# Patient Record
Sex: FEMALE | Race: BLACK OR AFRICAN AMERICAN | URBAN - METROPOLITAN AREA
[De-identification: names, ages, dates, MRNs, and addresses within clinical notes are randomized per-mention and may not be internally consistent; named-entity substitution may affect disease eponyms.]

---

## 2017-01-05 ENCOUNTER — OFFICE VISIT (OUTPATIENT)
Dept: FAMILY MEDICINE CLINIC | Age: 27
End: 2017-01-05

## 2017-01-05 VITALS
TEMPERATURE: 99.1 F | DIASTOLIC BLOOD PRESSURE: 86 MMHG | OXYGEN SATURATION: 98 % | BODY MASS INDEX: 33.49 KG/M2 | HEIGHT: 65 IN | WEIGHT: 201 LBS | RESPIRATION RATE: 16 BRPM | SYSTOLIC BLOOD PRESSURE: 111 MMHG | HEART RATE: 74 BPM

## 2017-01-05 DIAGNOSIS — J45.20 MILD INTERMITTENT ASTHMA WITHOUT COMPLICATION: ICD-10-CM

## 2017-01-05 DIAGNOSIS — K64.8 HEMORRHOID PROLAPSE: Primary | ICD-10-CM

## 2017-01-05 DIAGNOSIS — M25.562 ACUTE PAIN OF LEFT KNEE: ICD-10-CM

## 2017-01-05 RX ORDER — HYDROCORTISONE ACETATE PRAMOXINE HCL 1; 1 G/100G; G/100G
CREAM TOPICAL 2 TIMES DAILY
Qty: 30 G | Refills: 0 | Status: SHIPPED | OUTPATIENT
Start: 2017-01-05 | End: 2017-01-12

## 2017-01-05 RX ORDER — MELOXICAM 15 MG/1
15 TABLET ORAL
Qty: 30 TAB | Refills: 0 | Status: SHIPPED | OUTPATIENT
Start: 2017-01-05

## 2017-01-05 RX ORDER — FLUTICASONE PROPIONATE 110 UG/1
2 AEROSOL, METERED RESPIRATORY (INHALATION) EVERY 12 HOURS
Qty: 1 INHALER | Refills: 5 | Status: SHIPPED | OUTPATIENT
Start: 2017-01-05 | End: 2017-01-12

## 2017-01-05 RX ORDER — ALBUTEROL SULFATE 90 UG/1
2 AEROSOL, METERED RESPIRATORY (INHALATION)
Qty: 1 INHALER | Refills: 1 | Status: SHIPPED | OUTPATIENT
Start: 2017-01-05

## 2017-01-05 NOTE — MR AVS SNAPSHOT
Visit Information Date & Time Provider Department Dept. Phone Encounter #  
 1/5/2017  3:30 PM Mariel Parker PA-C [facility] [phone] [id number] Follow-up Instructions Return if symptoms worsen or fail to improve. Upcoming Health Maintenance Date Due  
 HPV AGE 9Y-34Y (1 of 3 - Female 3 Dose Series) 1/25/2002 DTaP/Tdap/Td series (1 - Tdap) 1/25/2012 PAP AKA CERVICAL CYTOLOGY 1/25/2012 INFLUENZA AGE 9 TO ADULT 8/1/2016 Allergies as of 1/5/2017  Review Complete On: 1/5/2017 By: Florentino Reeder LPN No Known Allergies Current Immunizations  Never Reviewed No immunizations on file. Not reviewed this visit You Were Diagnosed With   
  
 Codes Comments Hemorrhoid prolapse    -  Primary ICD-10-CM: P31.8 ICD-9-CM: 455.8 Acute pain of left knee     ICD-10-CM: M25.562 ICD-9-CM: 719.46 Mild intermittent asthma without complication     [address]: J45.20 ICD-9-CM: 493.90 Vitals BP Pulse Temp Resp Height(growth percentile) Weight(growth percentile) 111/86 (BP 1 Location: Right arm, BP Patient Position: Sitting) 74 99.1 °F (37.3 °C) (Oral) 16 5' 5\" (1.651 m) 201 lb (91.2 kg) SpO2 BMI OB Status Smoking Status 98% 33.45 kg/m2 Implant Never Smoker BMI and BSA Data Body Mass Index Body Surface Area  
 33.45 kg/m 2 2.05 m 2 Preferred Pharmacy Pharmacy Name Phone Kristin Blizzard [address] [address] [facility] [phone] Your Updated Medication List  
  
   
This list is accurate as of: 1/5/17  4:34 PM.  Always use your most recent med list.  
  
  
  
  
 albuterol 90 mcg/actuation inhaler Commonly known as:  PROVENTIL HFA, VENTOLIN HFA, PROAIR HFA Take 2 Puffs by inhalation every six (6) hours as needed for Wheezing. fluticasone 110 mcg/actuation inhaler Commonly known as:  FLOVENT HFA Take 2 Puffs by inhalation every twelve (12) hours. HYDROcodone-acetaminophen 7.5-325 mg per tablet Commonly known as:  Linares Minor Take 1 Tab by mouth nightly as needed for Pain. Max Daily Amount: 1 Tab. IMPLANON 68 mg Impl Generic drug:  etonogestrel  
by SubDERmal route. meloxicam 15 mg tablet Commonly known as:  MOBIC Take 1 Tab by mouth daily as needed (inflammation). pramoxine-hydrocortisone 1-1 % rectal cream  
Commonly known as:  ANALPRAM HC 1% Insert  into rectum two (2) times a day. Prescriptions Sent to Pharmacy Refills  
 fluticasone (FLOVENT HFA) 110 mcg/actuation inhaler 5 Sig: Take 2 Puffs by inhalation every twelve (12) hours. Class: Normal  
 Pharmacy: [address] #: [phone] Route: Inhalation  
 albuterol (PROVENTIL HFA, VENTOLIN HFA, PROAIR HFA) 90 mcg/actuation inhaler 1 Sig: Take 2 Puffs by inhalation every six (6) hours as needed for Wheezing. Class: Normal  
 Pharmacy: [address] #: [phone] Route: Inhalation  
 pramoxine-hydrocortisone (ANALPRAM HC 1%) 1-1 % rectal cream 0 Sig: Insert  into rectum two (2) times a day. Class: Normal  
 Pharmacy: [address] #: [phone] Route: Rectal  
 meloxicam (MOBIC) 15 mg tablet 0 Sig: Take 1 Tab by mouth daily as needed (inflammation). Class: Normal  
 Pharmacy: [address] #: [phone] Route: Oral  
  
Follow-up Instructions Return if symptoms worsen or fail to improve. Patient Instructions Use anal cream twice daily for 1 week, then switch to plain preparation-H if you still have some discomfort. Use Meloxicam once daily for the next 2 weeks. Avoid taking other NSAIDs (aspirin, ibuprofen, aleve, etc.) while using meloxicam.  
Return if symptoms worsen or fail to improve. Hemorrhoids: Care Instructions Your Care Instructions Hemorrhoids are enlarged veins that develop in the anal canal. Bleeding during bowel movements, itching, swelling, and rectal pain are the most common symptoms. They can be uncomfortable at times, but hemorrhoids rarely are a serious problem. You can treat most hemorrhoids with simple changes to your diet and bowel habits. These changes include eating more fiber and not straining to pass stools. Most hemorrhoids do not need surgery or other treatment unless they are very large and painful or bleed a lot. Follow-up care is a key part of your treatment and safety. Be sure to make and go to all appointments, and call your doctor if you are having problems. Its also a good idea to know your test results and keep a list of the medicines you take. How can you care for yourself at home? · Sit in a few inches of warm water (sitz bath) 3 times a day and after bowel movements. The warm water helps with pain and itching. · Put ice on your anal area several times a day for 10 minutes at a time. Put a thin cloth between the ice and your skin. Follow this by placing a warm, wet towel on the area for another 10 to 20 minutes. · Take pain medicines exactly as directed. ¨ If the doctor gave you a prescription medicine for pain, take it as prescribed. ¨ If you are not taking a prescription pain medicine, ask your doctor if you can take an over-the-counter medicine. · Keep the anal area clean, but be gentle. Use water and a fragrance-free soap, such as [place], or use baby wipes or medicated pads, such as Tucks. · Wear cotton underwear and loose clothing to decrease moisture in the anal area. · Eat more fiber. Include foods such as whole-grain breads and cereals, raw vegetables, raw and dried fruits, and beans. · Drink plenty of fluids, enough so that your urine is light yellow or clear like water.  If you have kidney, heart, or liver disease and have to limit fluids, talk with your doctor before you increase the amount of fluids you drink. · Use a stool softener that contains bran or psyllium. You can save money by buying bran or psyllium (available in bulk at most health food stores) and sprinkling it on foods or stirring it into fruit juice. Or you can use a product such as Metamucil or Hydrocil. · Practice healthy bowel habits. ¨ Go to the bathroom as soon as you have the urge. ¨ Avoid straining to pass stools. Relax and give yourself time to let things happen naturally. ¨ Do not hold your breath while passing stools. ¨ Do not read while sitting on the toilet. Get off the toilet as soon as you have finished. · Take your medicines exactly as prescribed. Call your doctor if you think you are having a problem with your medicine. When should you call for help? Call 911 anytime you think you may need emergency care. For example, call if: 
· You pass maroon or very bloody stools. Call your doctor now or seek immediate medical care if: 
· You have increased pain. · You have increased bleeding. Watch closely for changes in your health, and be sure to contact your doctor if: 
· Your symptoms have not improved after 3 or 4 days. Where can you learn more? Go to http://carolyn-wyatt.info/. Enter F228 in the search box to learn more about \"Hemorrhoids: Care Instructions. \" Current as of: August 9, 2016 Content Version: 11.1 © 4577-2261 Boxed. Care instructions adapted under license by [employer] (which disclaims liability or warranty for this information). If you have questions about a medical condition or this instruction, always ask your healthcare professional. [place] [address] any warranty or liability for your use of this information. Knee Pain or Injury: Care Instructions Your Care Instructions Injuries are a common cause of knee problems.  Sudden (acute) injuries may be caused by a direct blow to the knee. They can also be caused by abnormal twisting, bending, or falling on the knee. Pain, bruising, or swelling may be severe, and may start within minutes of the injury. Overuse is another cause of knee pain. Other causes are climbing stairs, kneeling, and other activities that use the knee. Everyday wear and tear, especially as you get older, also can cause knee pain. Rest, along with home treatment, often relieves pain and allows your knee to heal. If you have a serious knee injury, you may need tests and treatment. Follow-up care is a key part of your treatment and safety. Be sure to make and go to all appointments, and call your doctor if you are having problems. It's also a good idea to know your test results and keep a list of the medicines you take. How can you care for yourself at home? · Be safe with medicines. Read and follow all instructions on the label. ¨ If the doctor gave you a prescription medicine for pain, take it as prescribed. ¨ If you are not taking a prescription pain medicine, ask your doctor if you can take an over-the-counter medicine. · Rest and protect your knee. Take a break from any activity that may cause pain. · Put ice or a cold pack on your knee for 10 to 20 minutes at a time. Put a thin cloth between the ice and your skin. · Prop up a sore knee on a pillow when you ice it or anytime you sit or lie down for the next 3 days. Try to keep it above the level of your heart. This will help reduce swelling. · If your knee is not swollen, you can put moist heat, a heating pad, or a warm cloth on your knee. · If your doctor recommends an elastic bandage, sleeve, or other type of support for your knee, wear it as directed. · Follow your doctor's instructions about how much weight you can put on your leg. Use a cane, crutches, or a walker as instructed.  
· Follow your doctor's instructions about activity during your healing process. If you can do mild exercise, slowly increase your activity. · Reach and stay at a healthy weight. Extra weight can strain the joints, especially the knees and hips, and make the pain worse. Losing even a few pounds may help. When should you call for help? Call 911 anytime you think you may need emergency care. For example, call if: 
· You have symptoms of a blood clot in your lung (called a pulmonary embolism). These may include: 
¨ Sudden chest pain. ¨ Trouble breathing. ¨ Coughing up blood. Call your doctor now or seek immediate medical care if: 
· You have severe or increasing pain. · Your leg or foot turns cold or changes color. · You cannot stand or put weight on your knee. · Your knee looks twisted or bent out of shape. · You cannot move your knee. · You have signs of infection, such as: 
¨ Increased pain, swelling, warmth, or redness. ¨ Red streaks leading from the knee. ¨ Pus draining from a place on your knee. ¨ A fever. · You have signs of a blood clot in your leg (called a deep vein thrombosis), such as: 
¨ Pain in your calf, back of the knee, thigh, or groin. ¨ Redness and swelling in your leg or groin. Watch closely for changes in your health, and be sure to contact your doctor if: 
· You have tingling, weakness, or numbness in your knee. · You have any new symptoms, such as swelling. · You have bruises from a knee injury that last longer than 2 weeks. · You do not get better as expected. Where can you learn more? Go to http://carolyn-wyatt.info/. Enter K195 in the search box to learn more about \"Knee Pain or Injury: Care Instructions. \" Current as of: May 27, 2016 Content Version: 11.1 © 6764-7075 [employer]. Care instructions adapted under license by [employer] (which disclaims liability or warranty for this information).  If you have questions about a medical condition or this instruction, always ask your healthcare professional. [address] any warranty or liability for your use of this information. Introducing [facility]! Dear Ivan Gonzalez: Thank you for requesting a One[employer] account. Our records indicate that you already have an active One[employer]ath account. You can access your account anytime at [email or web address] Did you know that you can access your hospital and ER discharge instructions at any time in OneBreath? You can also review all of your test results from your hospital stay or ER visit. Additional Information If you have questions, please visit the Frequently Asked Questions section of the OneBreath website at [email or web address] Remember, OneBreath is NOT to be used for urgent needs. For medical emergencies, dial 911. Now available from your iPhone and Android! Please provide this summary of care documentation to your next provider. If you have any questions after today's visit, please call [phone].

## 2017-01-05 NOTE — PATIENT INSTRUCTIONS
Use anal cream twice daily for 1 week, then switch to plain preparation-H if you still have some discomfort. Use Meloxicam once daily for the next 2 weeks. Avoid taking other NSAIDs (aspirin, ibuprofen, aleve, etc.) while using meloxicam.   Return if symptoms worsen or fail to improve. Hemorrhoids: Care Instructions  Your Care Instructions    Hemorrhoids are enlarged veins that develop in the anal canal. Bleeding during bowel movements, itching, swelling, and rectal pain are the most common symptoms. They can be uncomfortable at times, but hemorrhoids rarely are a serious problem. You can treat most hemorrhoids with simple changes to your diet and bowel habits. These changes include eating more fiber and not straining to pass stools. Most hemorrhoids do not need surgery or other treatment unless they are very large and painful or bleed a lot. Follow-up care is a key part of your treatment and safety. Be sure to make and go to all appointments, and call your doctor if you are having problems. Its also a good idea to know your test results and keep a list of the medicines you take. How can you care for yourself at home? · Sit in a few inches of warm water (sitz bath) 3 times a day and after bowel movements. The warm water helps with pain and itching. · Put ice on your anal area several times a day for 10 minutes at a time. Put a thin cloth between the ice and your skin. Follow this by placing a warm, wet towel on the area for another 10 to 20 minutes. · Take pain medicines exactly as directed. ¨ If the doctor gave you a prescription medicine for pain, take it as prescribed. ¨ If you are not taking a prescription pain medicine, ask your doctor if you can take an over-the-counter medicine. · Keep the anal area clean, but be gentle. Use water and a fragrance-free soap, such as [place], or use baby wipes or medicated pads, such as Tucks.   · Wear cotton underwear and loose clothing to decrease moisture in the anal area. · Eat more fiber. Include foods such as whole-grain breads and cereals, raw vegetables, raw and dried fruits, and beans. · Drink plenty of fluids, enough so that your urine is light yellow or clear like water. If you have kidney, heart, or liver disease and have to limit fluids, talk with your doctor before you increase the amount of fluids you drink. · Use a stool softener that contains bran or psyllium. You can save money by buying bran or psyllium (available in bulk at most health food stores) and sprinkling it on foods or stirring it into fruit juice. Or you can use a product such as Metamucil or Hydrocil. · Practice healthy bowel habits. ¨ Go to the bathroom as soon as you have the urge. ¨ Avoid straining to pass stools. Relax and give yourself time to let things happen naturally. ¨ Do not hold your breath while passing stools. ¨ Do not read while sitting on the toilet. Get off the toilet as soon as you have finished. · Take your medicines exactly as prescribed. Call your doctor if you think you are having a problem with your medicine. When should you call for help? Call 911 anytime you think you may need emergency care. For example, call if:  · You pass maroon or very bloody stools. Call your doctor now or seek immediate medical care if:  · You have increased pain. · You have increased bleeding. Watch closely for changes in your health, and be sure to contact your doctor if:  · Your symptoms have not improved after 3 or 4 days. Where can you learn more? Go to http://carolyn-wyatt.info/. Enter F228 in the search box to learn more about \"Hemorrhoids: Care Instructions. \"  Current as of: August 9, 2016  Content Version: 11.1  © 5982-8041 SomethingIndie. Care instructions adapted under license by [employer] (which disclaims liability or warranty for this information).  If you have questions about a medical condition or this instruction, always ask your healthcare professional. [place] [address] any warranty or liability for your use of this information. Knee Pain or Injury: Care Instructions  Your Care Instructions    Injuries are a common cause of knee problems. Sudden (acute) injuries may be caused by a direct blow to the knee. They can also be caused by abnormal twisting, bending, or falling on the knee. Pain, bruising, or swelling may be severe, and may start within minutes of the injury. Overuse is another cause of knee pain. Other causes are climbing stairs, kneeling, and other activities that use the knee. Everyday wear and tear, especially as you get older, also can cause knee pain. Rest, along with home treatment, often relieves pain and allows your knee to heal. If you have a serious knee injury, you may need tests and treatment. Follow-up care is a key part of your treatment and safety. Be sure to make and go to all appointments, and call your doctor if you are having problems. It's also a good idea to know your test results and keep a list of the medicines you take. How can you care for yourself at home? · Be safe with medicines. Read and follow all instructions on the label. ¨ If the doctor gave you a prescription medicine for pain, take it as prescribed. ¨ If you are not taking a prescription pain medicine, ask your doctor if you can take an over-the-counter medicine. · Rest and protect your knee. Take a break from any activity that may cause pain. · Put ice or a cold pack on your knee for 10 to 20 minutes at a time. Put a thin cloth between the ice and your skin. · Prop up a sore knee on a pillow when you ice it or anytime you sit or lie down for the next 3 days. Try to keep it above the level of your heart. This will help reduce swelling. · If your knee is not swollen, you can put moist heat, a heating pad, or a warm cloth on your knee.   · If your doctor recommends an elastic bandage, sleeve, or other type of support for your knee, wear it as directed. · Follow your doctor's instructions about how much weight you can put on your leg. Use a cane, crutches, or a walker as instructed. · Follow your doctor's instructions about activity during your healing process. If you can do mild exercise, slowly increase your activity. · Reach and stay at a healthy weight. Extra weight can strain the joints, especially the knees and hips, and make the pain worse. Losing even a few pounds may help. When should you call for help? Call 911 anytime you think you may need emergency care. For example, call if:  · You have symptoms of a blood clot in your lung (called a pulmonary embolism). These may include:  ¨ Sudden chest pain. ¨ Trouble breathing. ¨ Coughing up blood. Call your doctor now or seek immediate medical care if:  · You have severe or increasing pain. · Your leg or foot turns cold or changes color. · You cannot stand or put weight on your knee. · Your knee looks twisted or bent out of shape. · You cannot move your knee. · You have signs of infection, such as:  ¨ Increased pain, swelling, warmth, or redness. ¨ Red streaks leading from the knee. ¨ Pus draining from a place on your knee. ¨ A fever. · You have signs of a blood clot in your leg (called a deep vein thrombosis), such as:  ¨ Pain in your calf, back of the knee, thigh, or groin. ¨ Redness and swelling in your leg or groin. Watch closely for changes in your health, and be sure to contact your doctor if:  · You have tingling, weakness, or numbness in your knee. · You have any new symptoms, such as swelling. · You have bruises from a knee injury that last longer than 2 weeks. · You do not get better as expected. Where can you learn more? Go to http://carolyn-wyatt.info/. Enter K195 in the search box to learn more about \"Knee Pain or Injury: Care Instructions. \"  Current as of: May 27, 2016  Content Version: 11.1  © 8071-4990 [employer]. Care instructions adapted under license by [employer] (which disclaims liability or warranty for this information). If you have questions about a medical condition or this instruction, always ask your healthcare professional. [place] [address] any warranty or liability for your use of this information.

## 2017-01-05 NOTE — PROGRESS NOTES
Patient presents to the clinic for rectal bleeding that began 5 days ago. Patient complains of bright red blood, constant bleeding. Patient would also like to discuss left knee pain. Requested Prescriptions     Pending Prescriptions Disp Refills    fluticasone (FLOVENT HFA) 110 mcg/actuation inhaler 1 Inhaler 5     Sig: Take 2 Puffs by inhalation every twelve (12) hours.  albuterol (PROVENTIL HFA, VENTOLIN HFA, PROAIR HFA) 90 mcg/actuation inhaler 1 Inhaler 1     Sig: Take 2 Puffs by inhalation every six (6) hours as needed for Wheezing. Flu shot requested: no    Depression Screening Completed: yes    Learning Assessment Completed: yes    Abuse Screening Completed: n/a    Health Maintenance reviewed and discussed per provider: yes    Advance Directive:    1. Do you have an advance directive in place? Patient Reply: no    2. If not, would you like material regarding how to put one in place? Patient Reply: no     Coordination of Care:    1. Have you been to the ER, urgent care clinic since your last visit? Hospitalized since your last visit? no    2. Have you seen or consulted any other health care providers outside of the [address] [place] [address] since your last visit? Include any pap smears or colon screening.  no

## 2017-01-05 NOTE — PROGRESS NOTES
HISTORY OF PRESENT ILLNESS  Sohail Medel is a 23 y.o. female. HPI Comments: Pt with history of interal hemorrhoids presents for 5 days of rectal bleeding. She describes a gush of bright red blood from the rectum that occurred 5 days ago. She has since been bleeding with bowel movements, and when she checks the rectal area following urination, there is blood as well. She has a picture of the anus on her phone showing a protrusion toward the posterior aspect of the anus, which she states wasn't there before. She states the bleeding has never been this bad before. She hasn't treated it with anything yet and does not take any fiber supplements. States she has regular, daily bowel movements. She also has pain in her left knee (she points to the lateral aspect of her knee near the knee cap). She also experienes popping with extension. She just woke up one day, about a month ago, extended the knee, and experienced the popping along with sharp pain. States she tried Tylenol, which did not seem to help. She would also like a refill of her asthma medications. States she has exercise-induced asthma and just uses the inhalers as needed. Rectal Bleeding   Pertinent negatives include no chest pain, no abdominal pain and no shortness of breath. Knee Pain   Pertinent negatives include no chest pain, no abdominal pain and no shortness of breath. Review of Systems   Constitutional: Negative for chills and fever. Respiratory: Negative for shortness of breath. Cardiovascular: Negative for chest pain. Gastrointestinal: Positive for anal bleeding and blood in stool (see HPI). Negative for abdominal pain, constipation and diarrhea. Musculoskeletal: Positive for joint pain (left knee). Skin: Negative for itching.      Visit Vitals    /86 (BP 1 Location: Right arm, BP Patient Position: Sitting)    Pulse 74    Temp 99.1 °F (37.3 °C) (Oral)    Resp 16    Ht 5' 5\" (1.651 m)    Wt 201 lb (91.2 kg)    SpO2 98%    BMI 33.45 kg/m2       Physical Exam   Constitutional: She is oriented to person, place, and time. Vital signs are normal. She appears well-developed and well-nourished. She is cooperative. She does not appear ill. No distress. HENT:   Head: Normocephalic and atraumatic. Right Ear: External ear normal.   Left Ear: External ear normal.   Nose: Nose normal. No nasal deformity. Eyes: Conjunctivae are normal.   Neck: Neck supple. Cardiovascular: Normal rate, regular rhythm and normal heart sounds. Exam reveals no gallop and no friction rub. No murmur heard. Pulses:       Radial pulses are 2+ on the right side, and 2+ on the left side. Pulmonary/Chest: Effort normal and breath sounds normal. She has no decreased breath sounds. She has no wheezes. She has no rhonchi. She has no rales. Genitourinary:         Genitourinary Comments: Normal rectal tone. No masses noted in the rectal vault. Musculoskeletal:        Right knee: Normal.        Left knee: She exhibits no swelling, no effusion, no deformity, no LCL laxity, normal patellar mobility and no MCL laxity. Tenderness found. Lateral joint line tenderness noted. Neurological: She is alert and oriented to person, place, and time. Skin: Skin is warm and dry. Psychiatric: She has a normal mood and affect. Her speech is normal and behavior is normal. Thought content normal.   Nursing note and vitals reviewed. ASSESSMENT and PLAN    ICD-10-CM ICD-9-CM    1. Hemorrhoid prolapse K64.8 455.8 pramoxine-hydrocortisone (ANALPRAM HC 1%) 1-1 % rectal cream   2. Acute pain of left knee M25.562 719.46 meloxicam (MOBIC) 15 mg tablet   3. Mild intermittent asthma without complication F74.80 076.11 fluticasone (FLOVENT HFA) 110 mcg/actuation inhaler      albuterol (PROVENTIL HFA, VENTOLIN HFA, PROAIR HFA) 90 mcg/actuation inhaler     1. History and exam suggest a prolapsed internal hemorrhoid. No signs of thrombosis noted.  Consider referral to colon and rectal surgery if conservative treatment not helpful. 2. Possible meniscal injury given symptoms of popping, pain, and joint-line tenderness. Attempting treatment with NSAIDs. Consider referral to ortho or sports med with persistent/recurrent symptoms. Recommendations:  Use anal cream twice daily for 1 week, then switch to plain preparation-H if you still have some discomfort. Use Meloxicam once daily for the next 2 weeks. Avoid taking other NSAIDs (aspirin, ibuprofen, aleve, etc.) while using meloxicam.   Return if symptoms worsen or fail to improve. Provided after-visit information on  Hemorrhoids & Knee Pain  Reviewed reasons to return or seek emergency care. Pt verbalized understanding and agreement with the plan of care.     Srinath Garnica PA-C

## 2017-01-12 ENCOUNTER — OFFICE VISIT (OUTPATIENT)
Dept: ORTHOPEDIC SURGERY | Age: 27
End: 2017-01-12

## 2017-01-12 ENCOUNTER — OFFICE VISIT (OUTPATIENT)
Dept: FAMILY MEDICINE CLINIC | Age: 27
End: 2017-01-12

## 2017-01-12 VITALS
HEART RATE: 66 BPM | TEMPERATURE: 98.6 F | SYSTOLIC BLOOD PRESSURE: 127 MMHG | HEIGHT: 65 IN | OXYGEN SATURATION: 97 % | RESPIRATION RATE: 16 BRPM | WEIGHT: 203 LBS | DIASTOLIC BLOOD PRESSURE: 85 MMHG | BODY MASS INDEX: 33.82 KG/M2

## 2017-01-12 VITALS
TEMPERATURE: 98.4 F | RESPIRATION RATE: 16 BRPM | SYSTOLIC BLOOD PRESSURE: 128 MMHG | HEIGHT: 65 IN | DIASTOLIC BLOOD PRESSURE: 88 MMHG | OXYGEN SATURATION: 98 % | BODY MASS INDEX: 33.82 KG/M2 | WEIGHT: 203 LBS | HEART RATE: 69 BPM

## 2017-01-12 DIAGNOSIS — M67.52 PLICA OF KNEE, LEFT: Primary | ICD-10-CM

## 2017-01-12 DIAGNOSIS — M25.562 ACUTE PAIN OF LEFT KNEE: ICD-10-CM

## 2017-01-12 DIAGNOSIS — M25.562 ACUTE PAIN OF LEFT KNEE: Primary | ICD-10-CM

## 2017-01-12 RX ORDER — PREDNISONE 20 MG/1
TABLET ORAL
Qty: 22 TAB | Refills: 0 | Status: SHIPPED | OUTPATIENT
Start: 2017-01-12

## 2017-01-12 RX ORDER — ACETAMINOPHEN AND CODEINE PHOSPHATE 300; 30 MG/1; MG/1
1-2 TABLET ORAL
Qty: 45 TAB | Refills: 0 | Status: SHIPPED | OUTPATIENT
Start: 2017-01-12 | End: 2017-01-27

## 2017-01-12 NOTE — PROGRESS NOTES
1. Have you been to the ER, urgent care clinic since your last visit? Hospitalized since your last visit? new to  practice    2. Have you seen or consulted any other health care providers outside of the [facility] since your last visit? Include any pap smears or colon screening.   new to  practice

## 2017-01-12 NOTE — MR AVS SNAPSHOT
Visit Information Date & Time Provider Department Dept. Phone Encounter #  
 1/12/2017  1:00 PM Bacilio Abreu, [address] [facility] [address] [phone] [id number] Follow-up Instructions Return if symptoms worsen or fail to improve. Upcoming Health Maintenance Date Due  
 HPV AGE 9Y-34Y (1 of 3 - Female 3 Dose Series) 1/25/2002 Pneumococcal 19-64 Medium Risk (1 of 1 - PPSV23) 1/25/2010 DTaP/Tdap/Td series (1 - Tdap) 1/25/2012 PAP AKA CERVICAL CYTOLOGY 1/25/2012 INFLUENZA AGE 9 TO ADULT 8/1/2016 Allergies as of 1/12/2017  Review Complete On: 1/12/2017 By: Artur Olmedo LPN No Known Allergies Current Immunizations  Never Reviewed No immunizations on file. Not reviewed this visit You Were Diagnosed With   
  
 Codes Comments Acute pain of left knee    -  Primary ICD-10-CM: L58.556 ICD-9-CM: 719.46 Vitals BP Pulse Temp Resp Height(growth percentile) Weight(growth percentile) 127/85 (BP 1 Location: Left arm, BP Patient Position: Sitting) 66 98.6 °F (37 °C) (Oral) 16 5' 5\" (1.651 m) 203 lb (92.1 kg) SpO2 BMI OB Status Smoking Status 97% 33.78 kg/m2 Implant Never Smoker BMI and BSA Data Body Mass Index Body Surface Area 33.78 kg/m 2 2.06 m 2 Preferred Pharmacy Pharmacy Name Phone [address] [address] [facility] [phone] Your Updated Medication List  
  
   
This list is accurate as of: 1/12/17  1:34 PM.  Always use your most recent med list.  
  
  
  
  
 albuterol 90 mcg/actuation inhaler Commonly known as:  PROVENTIL HFA, VENTOLIN HFA, PROAIR HFA Take 2 Puffs by inhalation every six (6) hours as needed for Wheezing. IMPLANON 68 mg Impl Generic drug:  etonogestrel  
by SubDERmal route. meloxicam 15 mg tablet Commonly known as:  MOBIC Take 1 Tab by mouth daily as needed (inflammation). We Performed the Following REFERRAL TO SPORTS MEDICINE [BYL149 Custom] Comments:  
 Please evaluate patient for knee pain, no relief Mobic. Follow-up Instructions Return if symptoms worsen or fail to improve. Referral Information Referral ID Referred By Referred To  
  
 [id number] [facility] Brittany Vanna Dsouza Phone: [phone] Fax: [phone] Visits Status Start Date End Date 1 New Request 1/12/17 1/12/18 If your referral has a status of pending review or denied, additional information will be sent to support the outcome of this decision. Patient Instructions Follow up with our sports medicine physician at [facility] Introducing [facility]! Dear Bernardo Francois: Thank you for requesting a [employer] account. Our records indicate that you already have an active T[employer]alu account. You can access your account anytime at [email or web address] Did you know that you can access your hospital and ER discharge instructions at any time in Toodalu? You can also review all of your test results from your hospital stay or ER visit. Additional Information If you have questions, please visit the Frequently Asked Questions section of the Tood[employer]u website at [email or web address] Remember, T[employer]u is NOT to be used for urgent needs. For medical emergencies, dial 911. Now available from your iPhone and Android! Please provide this summary of care documentation to your next provider. If you have any questions after today's visit, please call [phone].

## 2017-01-12 NOTE — PROGRESS NOTES
Patient presents to the clinic for follow up on left knee pain. Patient complains of difficulty walking, swelling pain when bended. Flu shot requested: no    Depression Screening Completed: yes    Learning Assessment Completed: yes    Abuse Screening Completed: n/a    Health Maintenance reviewed and discussed per provider: yes    Advance Directive:    1. Do you have an advance directive in place? Patient Reply: no    2. If not, would you like material regarding how to put one in place? Patient Reply: no     Coordination of Care:    1. Have you been to the ER, urgent care clinic since your last visit? Hospitalized since your last visit? no    2. Have you seen or consulted any other health care providers outside of the [address] [place] [address] since your last visit? Include any pap smears or colon screening.  no

## 2017-01-12 NOTE — PROGRESS NOTES
HISTORY OF PRESENT ILLNESS    Zak Ramirez is a 23y.o. year old female comes in today as new patient to be evaluated and treated at the request of Dr. Eileen Mena for my opinion/advice regarding: left knee pain    Has had pain in left knee for a month or so after waking and getting out of bed knee gave out and sharp pain. Bad at full flex or extend. Used meloxicam from PCP but pain seemed worse. Rated 9/10. Social History     Social History    Marital status: SINGLE     Spouse name: N/A    Number of children: N/A    Years of education: N/A     Social History Main Topics    Smoking status: Never Smoker    Smokeless tobacco: Never Used    Alcohol use Yes      Comment: socially    Drug use: None    Sexual activity: Yes     Partners: Male     Birth control/ protection: Implant     Other Topics Concern    None     Social History Narrative     Current Outpatient Prescriptions   Medication Sig Dispense Refill    albuterol (PROVENTIL HFA, VENTOLIN HFA, PROAIR HFA) 90 mcg/actuation inhaler Take 2 Puffs by inhalation every six (6) hours as needed for Wheezing. 1 Inhaler 1    meloxicam (MOBIC) 15 mg tablet Take 1 Tab by mouth daily as needed (inflammation). 30 Tab 0    etonogestrel (IMPLANON) 68 mg impl by SubDERmal route. Past Medical History   Diagnosis Date    Asthma 2005    Hemorrhoids      Family History   Problem Relation Age of Onset    Diabetes Mother     Hypertension Mother     Stroke Father     Heart Disease Paternal Grandmother     Hypertension Paternal Grandmother          ROS:  No numb, tingle, swell. All other systems reviewed and negative aside from that written in the HPI. Objective:  Visit Vitals    /88 (BP 1 Location: Left arm, BP Patient Position: Sitting)    Pulse 69    Temp 98.4 °F (36.9 °C) (Oral)    Resp 16    Ht 5' 5\" (1.651 m)    Wt 203 lb (92.1 kg)    SpO2 98%    BMI 33.78 kg/m2     HEENT: Conjunctiva/lids WNL. External canals/nares WNL. Tongue midline.  PERRL, EOMI. Hearing intact. NECK: Trachea midline. Supple, Full ROM. CARDIAC: RRR. S1S2. No Murmur. LUNGS: CTAB w/ normal effort. ABD: Soft, NT. No HSM. PSYCH: A+O x3. Appropriate judgment and insight. GEN: Appears stated age in NAD. HEAD:  Normocephalic, atraumatic. NEURO:  Sensation intact light touch B/L lower extremities. MS:  LE Strength +5/5 bilateral .   left Knee:  1+ Effusion . Lachman negative. Valgus negative. Varus negative. negative joint line tenderness medial, lateral.  Marysol negative. Posterior drawer negative. Noble compression test negative. Patellar apprehension negative. Patellar facet  negative tender to palpation medial, lateral no crepitance bilateral .  Tender plica medial and lateral and recreates Sx  EXT: no clubbing/cyanosis. no edema. SKIN: Warm/dry without rash. Assessment/Plan:     ICD-10-CM ICD-9-CM    1. Plica of knee, left L89.28 727.83 REFERRAL TO PHYSICAL THERAPY      predniSONE (DELTASONE) 20 mg tablet   2. Acute pain of left knee M25.562 719.46 REFERRAL TO PHYSICAL THERAPY       Patient verbalizes understanding of evaluation and plan. Will Tx with PT and prednisone and ice often w/ ROM frequently and RTC 4 weeks. Ts as marcos bourne. Thank you Dr. Lisa Ferris for sending me this pleasant young lady.

## 2017-01-12 NOTE — PROGRESS NOTES
HISTORY OF PRESENT ILLNESS  Sohail Colindres is a 23 y.o. female. HPI Comments: Pt was seen a week ago for, among several things, L knee pain. No history of injury, it just started hurting. Lencho prescribed Meloxicam for her. She is back today without any improvement. The pain is sharp and stabbing, as if someone is sticking something in her knee. It hurts more when she bends it, so she had to sit with it straight out last night at [address]. Knee Pain   Pertinent negatives include no chest pain, no abdominal pain, no headaches and no shortness of breath. Review of Systems   Constitutional: Negative for chills and fever. Eyes: Negative for blurred vision and double vision. Respiratory: Negative for cough and shortness of breath. Cardiovascular: Negative for chest pain and palpitations. Gastrointestinal: Negative for abdominal pain, nausea and vomiting. Musculoskeletal: Positive for joint pain (knee). Neurological: Negative for headaches. Physical Exam   Constitutional: Vital signs are normal. She appears well-developed and well-nourished. HENT:   Right Ear: Tympanic membrane and ear canal normal.   Left Ear: Tympanic membrane and ear canal normal.   Nose: Nose normal.   Mouth/Throat: Uvula is midline, oropharynx is clear and moist and mucous membranes are normal.   Eyes: Pupils are equal, round, and reactive to light. Cardiovascular: Normal rate and regular rhythm. Pulmonary/Chest: Effort normal and breath sounds normal.   Musculoskeletal:   L knee tender around patella and below it. No appreciable swelling or effusion. Knee is stable   Skin: No rash noted. Nursing note and vitals reviewed. ASSESSMENT and PLAN    ICD-10-CM ICD-9-CM    1. Acute pain of left knee M25.562 719.46 REFERRAL TO [facility] Garay has graciously agreed to see her right now.

## 2017-01-12 NOTE — MR AVS SNAPSHOT
Visit Information Date & Time Provider Department Dept. Phone Encounter #  
 1/12/2017  2:00 PM Lavonne Blount, [address] [facility] [phone] [id number] Follow-up Instructions Return in about 4 weeks (around 2/9/8956) for knee plica. Routing History Upcoming Health Maintenance Date Due  
 HPV AGE 9Y-34Y (1 of 3 - Female 3 Dose Series) 1/25/2002 Pneumococcal 19-64 Medium Risk (1 of 1 - PPSV23) 1/25/2010 DTaP/Tdap/Td series (1 - Tdap) 1/25/2012 PAP AKA CERVICAL CYTOLOGY 1/25/2012 INFLUENZA AGE 9 TO ADULT 8/1/2016 Allergies as of 1/12/2017  Review Complete On: 1/12/2017 By: Lavonne Blount, DO No Known Allergies Current Immunizations  Never Reviewed No immunizations on file. Not reviewed this visit You Were Diagnosed With   
  
 Codes Comments Plica of knee, left    -  Primary ICD-10-CM: M67.52 
ICD-9-CM: 727.83 Acute pain of left knee     ICD-10-CM: M25.562 ICD-9-CM: 719.46 Vitals BP Pulse Temp Resp Height(growth percentile) Weight(growth percentile) 128/88 (BP 1 Location: Left arm, BP Patient Position: Sitting) 69 98.4 °F (36.9 °C) (Oral) 16 5' 5\" (1.651 m) 203 lb (92.1 kg) SpO2 BMI OB Status Smoking Status 98% 33.78 kg/m2 Implant Never Smoker BMI and BSA Data Body Mass Index Body Surface Area 33.78 kg/m 2 2.06 m 2 Preferred Pharmacy Pharmacy Name Phone Pinkey Roof [address] [address] [facility] [phone] Your Updated Medication List  
  
   
This list is accurate as of: 1/12/17  2:31 PM.  Always use your most recent med list.  
  
  
  
  
 acetaminophen-codeine 300-30 mg per tablet Commonly known as:  TYLENOL-CODEINE #3 Take 1-2 Tabs by mouth every six (6) hours as needed for Pain for up to 15 days. Max Daily Amount: 8 Tabs. albuterol 90 mcg/actuation inhaler Commonly known as:  PROVENTIL HFA, VENTOLIN HFA, PROAIR HFA Take 2 Puffs by inhalation every six (6) hours as needed for Wheezing. IMPLANON 68 mg Impl Generic drug:  etonogestrel  
by SubDERmal route. meloxicam 15 mg tablet Commonly known as:  MOBIC Take 1 Tab by mouth daily as needed (inflammation). predniSONE 20 mg tablet Commonly known as:  Yong Lever Take 2 tabs in AM with food for 7 days then 1 tab until gone Prescriptions Printed Refills  
 acetaminophen-codeine (TYLENOL-CODEINE #3) 300-30 mg per tablet 0 Sig: Take 1-2 Tabs by mouth every six (6) hours as needed for Pain for up to 15 days. Max Daily Amount: 8 Tabs. Class: Print Route: Oral  
  
Prescriptions Sent to Pharmacy Refills  
 predniSONE (DELTASONE) 20 mg tablet 0 Sig: Take 2 tabs in AM with food for 7 days then 1 tab until gone Class: Normal  
 Pharmacy: [address] #: [phone] We Performed the Following REFERRAL TO PHYSICAL THERAPY [INR79 Custom] Follow-up Instructions Return in about 4 weeks (around 1/6/8566) for knee plica. Referral Information Referral ID Referred By Referred To  
  
 [id number] [facility]   
   [address] Phone: [phone] Fax: [phone] Visits Status Start Date End Date 1 New Request 1/12/17 1/12/18 If your referral has a status of pending review or denied, additional information will be sent to support the outcome of this decision. Patient Instructions   
[email or web address] Search YouTube for my channel: 
 
Dr. Jose Schultz Introducing [facility]! Dear Shahid Taylor: Thank you for requesting a Pharm[employer] account.   Our records indicate that you already have an active [employer] account. You can access your account anytime at [email or web address] Did you know that you can access your hospital and ER discharge instructions at any time in [employer]? You can also review all of your test results from your hospital stay or ER visit. Additional Information If you have questions, please visit the Frequently Asked Questions section of the [employer] website at [email or web address] Remember, [employer] is NOT to be used for urgent needs. For medical emergencies, dial 911. Now available from your iPhone and Android! Please provide this summary of care documentation to your next provider. If you have any questions after today's visit, please call [phone].

## 2017-01-12 NOTE — PROGRESS NOTES
Shahid Taylor is a 22 y/o female who presents today with knee pain that started a month ago upon awakening and standing out of bed. Upon standing out of bed a month ago she had a sharp pain on arising and extending her knee. The pain is present today and has gotten worse. It is sharp 9/10 when she bends, extends, and walks. Feels best when it is in a half-bent position. She feels that the knee gives out and pops at times, she stumbled yesterday when trying to walk. No numbness or tingling, and the pain does not radiate. She was previously given meloxicam on the 5th and she felt that has not helped her symptoms. *ATTENTION:  This note has been created by a medical student for educational purposes only. Please do not refer to the content of this note for clinical decision-making, billing, or other purposes. Please see attending physicians note to obtain clinical information on this patient. *

## 2022-03-19 PROBLEM — J45.20 MILD INTERMITTENT ASTHMA WITHOUT COMPLICATION: Status: ACTIVE | Noted: 2017-01-05

## 2022-04-11 ENCOUNTER — DOCUMENTATION ONLY (OUTPATIENT)
Dept: PULMONOLOGY | Age: 32
End: 2022-04-11

## 2022-04-11 NOTE — PROGRESS NOTES
Lf vm for pt to speak w/Deb in regs to np sleep apt ref by Dr. Kirby Can; when/where if any evals/studies/cpap/dme?  Ask screening questions

## 2023-04-27 RX ORDER — PREDNISONE 20 MG/1
TABLET ORAL
COMMUNITY
Start: 2017-01-12

## 2023-04-27 RX ORDER — ALBUTEROL SULFATE 90 UG/1
2 AEROSOL, METERED RESPIRATORY (INHALATION) EVERY 6 HOURS PRN
COMMUNITY
Start: 2017-01-05

## 2023-04-27 RX ORDER — MELOXICAM 15 MG/1
15 TABLET ORAL DAILY PRN
COMMUNITY
Start: 2017-01-05